# Patient Record
Sex: MALE | Race: WHITE
[De-identification: names, ages, dates, MRNs, and addresses within clinical notes are randomized per-mention and may not be internally consistent; named-entity substitution may affect disease eponyms.]

---

## 2018-11-06 ENCOUNTER — HOSPITAL ENCOUNTER (OUTPATIENT)
Dept: HOSPITAL 53 - M LAB REF | Age: 83
End: 2018-11-06
Attending: INTERNAL MEDICINE
Payer: MEDICARE

## 2018-11-06 DIAGNOSIS — D51.9: Primary | ICD-10-CM

## 2018-11-06 LAB
FOLATE SERPL-MCNC: 7.9 NG/ML
VIT B12 SERPL-MCNC: 1087 PG/ML

## 2018-11-06 PROCEDURE — 82746 ASSAY OF FOLIC ACID SERUM: CPT

## 2020-05-14 ENCOUNTER — HOSPITAL ENCOUNTER (OUTPATIENT)
Dept: HOSPITAL 53 - M LAB REF | Age: 85
End: 2020-05-14
Attending: DERMATOLOGY
Payer: MEDICARE

## 2020-05-14 DIAGNOSIS — C44.311: Primary | ICD-10-CM

## 2020-09-04 ENCOUNTER — HOSPITAL ENCOUNTER (EMERGENCY)
Dept: HOSPITAL 53 - M ED | Age: 85
Discharge: HOME | End: 2020-09-04
Payer: MEDICARE

## 2020-09-04 VITALS — SYSTOLIC BLOOD PRESSURE: 140 MMHG | DIASTOLIC BLOOD PRESSURE: 71 MMHG
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
## 2020-09-04 DIAGNOSIS — T82.898A: Primary | ICD-10-CM

## 2020-09-04 DIAGNOSIS — E11.9: ICD-10-CM
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
## 2020-09-04 DIAGNOSIS — E03.9: ICD-10-CM

## 2020-09-04 DIAGNOSIS — Z86.14: ICD-10-CM

## 2020-09-04 DIAGNOSIS — K21.9: ICD-10-CM

## 2020-09-04 DIAGNOSIS — Z79.4: ICD-10-CM

## 2020-09-04 DIAGNOSIS — M10.9: ICD-10-CM

## 2020-09-04 DIAGNOSIS — Z79.899: ICD-10-CM

## 2020-09-04 NOTE — REPVR
PROCEDURE INFORMATION: 

Exam: XR Chest, 1 View 

Exam date and time: 9/4/2020 10:54 AM 

Age: 85 years old 

Clinical indication: Device placement; Patient HX: Traumatic removal of right 

side PICC line. ER physician questioning any remaining catheter in place. ; 

Additional info: PICC line traumatic removal. 



TECHNIQUE: 

Imaging protocol: XR of the chest 

Views: 1 view. 



COMPARISON: 

No relevant prior studies available. 



FINDINGS: 

Tubes, catheters and devices: No retained catheter fragment is seen at the 

chest. 

Lungs: No pulmonary consolidation or edema. Multiple micronodular opacities of 

the bilateral lower lungs. Multiple calcified pulmonary nodules consistent with 

benign remote granulomas. 

Pleural space: The left lateral costophrenic angle is not imaged. No evident 

pleural effusion. No evident pneumothorax. 

Heart/Mediastinum: Heart size is within normal limits. 

Vasculature: Tortuous thoracic aorta. Aortic atherosclerotic calcification. 

Bones/joints: Right glenohumeral and acromioclavicular osteoarthritis. 



Other findings: The right upper extremity is incompletely imaged. 



IMPRESSION: 

1. No retained catheter fragment is seen at the chest. Chest CT could be 

considered for more sensitive evaluation. 

2. Nonspecific bilateral lower lung micronodules. Interstitial lung disease or 

pulmonary infection are considerations. This could be further evaluated with 

chest CT. 



Electronically signed by: Eric Blount On 09/04/2020  11:11:00 AM

## 2020-09-05 NOTE — ED PDOC
Post-Departure Follow-Up


dr vasquez faxed formal report of cxr for fu mlg Lundborg-Gray,Maja MD           Sep 5, 2020 14:26

## 2021-07-14 ENCOUNTER — HOSPITAL ENCOUNTER (OUTPATIENT)
Dept: HOSPITAL 53 - M LAB REF | Age: 86
End: 2021-07-14
Attending: INTERNAL MEDICINE
Payer: MEDICARE

## 2021-07-14 DIAGNOSIS — N18.31: Primary | ICD-10-CM

## 2022-02-07 ENCOUNTER — HOSPITAL ENCOUNTER (OUTPATIENT)
Dept: HOSPITAL 53 - M LAB REF | Age: 87
End: 2022-02-07
Attending: NURSE PRACTITIONER
Payer: MEDICARE

## 2022-02-07 DIAGNOSIS — N18.31: Primary | ICD-10-CM

## 2022-08-25 ENCOUNTER — HOSPITAL ENCOUNTER (OUTPATIENT)
Dept: HOSPITAL 53 - M LAB | Age: 87
End: 2022-08-25
Attending: PHYSICIAN ASSISTANT
Payer: MEDICARE

## 2022-08-25 DIAGNOSIS — R22.1: Primary | ICD-10-CM

## 2022-08-25 LAB
BUN SERPL-MCNC: 31 MG/DL (ref 7–18)
CREAT SERPL-MCNC: 2.1 MG/DL (ref 0.7–1.3)
GFR SERPL CREATININE-BSD FRML MDRD: 32 ML/MIN/{1.73_M2} (ref 35–?)

## 2022-09-07 ENCOUNTER — HOSPITAL ENCOUNTER (OUTPATIENT)
Dept: HOSPITAL 53 - M RAD | Age: 87
End: 2022-09-07
Attending: PHYSICIAN ASSISTANT
Payer: MEDICARE

## 2022-09-07 DIAGNOSIS — R22.1: Primary | ICD-10-CM

## 2023-03-16 ENCOUNTER — HOSPITAL ENCOUNTER (OUTPATIENT)
Dept: HOSPITAL 53 - M LAB REF | Age: 88
End: 2023-03-16
Attending: NURSE PRACTITIONER
Payer: MEDICARE

## 2023-03-16 DIAGNOSIS — I50.9: Primary | ICD-10-CM
